# Patient Record
Sex: MALE | Employment: PART TIME | URBAN - METROPOLITAN AREA
[De-identification: names, ages, dates, MRNs, and addresses within clinical notes are randomized per-mention and may not be internally consistent; named-entity substitution may affect disease eponyms.]

---

## 2017-06-27 ENCOUNTER — OFFICE VISIT (OUTPATIENT)
Dept: SURGERY | Age: 62
End: 2017-06-27

## 2017-06-27 VITALS
RESPIRATION RATE: 18 BRPM | HEART RATE: 70 BPM | DIASTOLIC BLOOD PRESSURE: 86 MMHG | BODY MASS INDEX: 25.73 KG/M2 | OXYGEN SATURATION: 96 % | SYSTOLIC BLOOD PRESSURE: 140 MMHG | HEIGHT: 72 IN | WEIGHT: 190 LBS | TEMPERATURE: 98.2 F

## 2017-06-27 DIAGNOSIS — Z12.11 ENCOUNTER FOR SCREENING COLONOSCOPY: Primary | ICD-10-CM

## 2017-06-27 RX ORDER — AMLODIPINE BESYLATE 10 MG/1
TABLET ORAL DAILY
COMMUNITY

## 2017-06-27 RX ORDER — POLYETHYLENE GLYCOL 3350, SODIUM SULFATE ANHYDROUS, SODIUM BICARBONATE, SODIUM CHLORIDE, POTASSIUM CHLORIDE 236; 22.74; 6.74; 5.86; 2.97 G/4L; G/4L; G/4L; G/4L; G/4L
POWDER, FOR SOLUTION ORAL
Qty: 1 BOTTLE | Refills: 0 | Status: SHIPPED | OUTPATIENT
Start: 2017-06-27

## 2017-06-27 NOTE — MR AVS SNAPSHOT
Visit Information Date & Time Provider Department Dept. Phone Encounter #  
 6/27/2017  9:00 AM BUBBA Vasquez. [id number] Upcoming Health Maintenance Date Due Hepatitis C Screening 1955 DTaP/Tdap/Td series (1 - Tdap) 8/17/1975 FOBT Q 1 YEAR AGE 50-75 8/17/2004 ZOSTER VACCINE AGE 60> 8/17/2014 INFLUENZA AGE 9 TO ADULT 8/1/2017 Allergies as of 6/27/2017  Review Complete On: 6/27/2017 By: Jose Enrique Gallo LPN No Known Allergies Current Immunizations  Never Reviewed No immunizations on file. Not reviewed this visit Vitals BP Pulse Temp Resp Height(growth percentile) Weight(growth percentile) 140/86 70 98.2 °F (36.8 °C) (Oral) 18 6' (1.829 m) 190 lb (86.2 kg) SpO2 BMI Smoking Status 96% 25.77 kg/m2 Former Smoker BMI and BSA Data Body Mass Index Body Surface Area 25.77 kg/m 2 2.09 m 2 Preferred Pharmacy Pharmacy Name Phone [facility] [place] [address] [phone] Your Updated Medication List  
  
   
This list is accurate as of: 6/27/17  9:53 AM.  Always use your most recent med list. amLODIPine 10 mg tablet Commonly known as:  Delphina Peat Take  by mouth daily. aspirin 81 mg tablet Take 81 mg by mouth. HumuLIN 70/30 100 unit/mL (70-30) injection Generic drug:  insulin NPH/insulin regular  
by SubCUTAneous route once. HYDROcodone-acetaminophen 5-325 mg per tablet Commonly known as:  Chris Kilts Take 1 Tab by mouth every four (4) hours as needed for Pain. lisinopril 10 mg tablet Commonly known as:  Arlette Kumar Take  by mouth daily. lovastatin 10 mg tablet Commonly known as:  MEVACOR Take  by mouth nightly. PEG 3350-Electrolytes 236-22.74-6.74 -5.86 gram suspension Commonly known as:  GO-LYTELY Take as directed  Indications: BOWEL EVACUATION  
  
 ZETIA 10 mg tablet Generic drug:  ezetimibe Take  by mouth. Prescriptions Sent to Pharmacy Refills PEG 3350-Electrolytes (GO-LYTELY) 236-22.74-6.74 -5.86 gram suspension 0 Sig: Take as directed  Indications: BOWEL EVACUATION Class: Normal  
 Pharmacy: [facility] at [facility] [address] #: [phone] Patient Instructions If you have any questions or concerns about today's appointment, the verbal and/or written instructions you were given for follow up care, please call our office at [phone]. [facility] [address] [place] [address], [facility] [address] [place] [phone] office [phone] Introducing [facility]! [facility] introduces [employer] patient portal. Now you can access parts of your medical record, email your doctor's office, and request medication refills online. 1. In your internet browser, go to [email or web address] 2. Click on the First Time User? Click Here link in the Sign In box. You will see the New Member Sign Up page. 3. Enter your [employer] Access Code exactly as it appears below. You will not need to use this code after youve completed the sign-up process. If you do not sign up before the expiration date, you must request a new code. · [employer] Access Code: [email or web address] Expires: 9/25/2017  9:35 AM 
 
4. Enter the last four digits of your Social Security Number (xxxx) and Date of Birth (mm/dd/yyyy) as indicated and click Submit. You will be taken to the next sign-up page. 5. Create a MyStreamt ID. This will be your [employer] login ID and cannot be changed, so think of one that is secure and easy to remember. 6. Create a [employer] password. You can change your password at any time. 7. Enter your Password Reset Question and Answer.  This can be used at a later time if you forget your password. 8. Enter your e-mail address. You will receive e-mail notification when new information is available in [address] 9. Click Sign Up. You can now view and download portions of your medical record. 10. Click the Download Summary menu link to download a portable copy of your medical information. If you have questions, please visit the Frequently Asked Questions section of the [employer] website. Remember, [employer] is NOT to be used for urgent needs. For medical emergencies, dial 911. Now available from your iPhone and Android! Please provide this summary of care documentation to your next provider. Your primary care clinician is listed as Vince Hughes.. If you have any questions after today's visit, please call [phone].

## 2017-06-27 NOTE — PROGRESS NOTES
Colon Screen    Patient: Gladys Birch MRN: [id number]  SSN: [id number]    YOB: 1955  Age: 57 y.o. Sex: male        Subjective:   Gladys Birch presents for colon screening. PCP is Toribio Kowalski MD. Patient denies rectal pain but does have rare rectal bleeding with BM. Patient has a bowel movement 1 per day. Patient has no known family history of colon cancer. Last colonoscopy was 12 years. He remembers having 2 benign polyps. No Known Allergies    Past Medical History:   Diagnosis Date    Diabetes ([facility] [address]     Diabetes ([facility] [address] [facility]     Hypertension      Past Surgical History:   Procedure Laterality Date    HX COLONOSCOPY      approx 12 years ago   [address] [place] [address] HERNIA REPAIR  1988      Family History   Problem Relation Age of Onset    Cancer Brother      myeloma    Cancer Sister      breast     Social History   Substance Use Topics    Smoking status: Former Smoker     Quit date: 6/20/1995    Smokeless tobacco: Never Used    Alcohol use Yes      Comment: beer/day      Prior to Admission medications    Medication Sig Start Date End Date Taking? Authorizing Provider   amLODIPine (NORVASC) 10 mg tablet Take  by mouth daily. Yes Historical Provider   lisinopril (PRINIVIL, ZESTRIL) 10 mg tablet Take  by mouth daily. Yes Nat Laughlin MD   lovastatin (MEVACOR) 10 mg tablet Take  by mouth nightly. Yes Nat Laughlin MD   insulin (HUMULIN 70/30) 100 unit/mL (70-30) injection by SubCUTAneous route once. Yes Nat Laughlin MD   ezetimibe (ZETIA) 10 mg tablet Take  by mouth. Yes Nat Laughlin MD   aspirin 81 mg tablet Take 81 mg by mouth. Yes Nat Laughlin MD   HYDROcodone-acetaminophen (NORCO) 5-325 mg per tablet Take 1 Tab by mouth every four (4) hours as needed for Pain. 6/20/13   Paul Pak NP          Review of Systems:  Gastrointestinal: positive for diarrhea      Risks colonoscopy described- colon injury, missed lesion, anesthesia problems, bleeding.     Colonoscopy preparation reviewed in detail with patient and a copy of the instructions was provided to the patient.        Angus Epstein LPN  June 27, 5809  8:25 AM

## 2017-06-27 NOTE — PATIENT INSTRUCTIONS
If you have any questions or concerns about today's appointment, the verbal and/or written instructions you were given for follow up care, please call our office at [phone].     [facility] - [address]    [phone] office  [phone]

## 2017-09-14 ENCOUNTER — HOSPITAL ENCOUNTER (OUTPATIENT)
Age: 62
Setting detail: OUTPATIENT SURGERY
Discharge: HOME | End: 2017-09-14
Attending: COLON & RECTAL SURGERY | Admitting: COLON & RECTAL SURGERY
Payer: COMMERCIAL

## 2017-09-14 VITALS
SYSTOLIC BLOOD PRESSURE: 104 MMHG | BODY MASS INDEX: 25.65 KG/M2 | DIASTOLIC BLOOD PRESSURE: 66 MMHG | RESPIRATION RATE: 18 BRPM | HEIGHT: 72 IN | WEIGHT: 189.4 LBS | TEMPERATURE: 98.4 F | OXYGEN SATURATION: 97 % | HEART RATE: 58 BPM

## 2017-09-14 LAB — GLUCOSE BLD STRIP.AUTO-MCNC: 91 MG/DL (ref 70–110)

## 2017-09-14 PROCEDURE — 76040000019: Performed by: COLON & RECTAL SURGERY

## 2017-09-14 PROCEDURE — G0500 MOD SEDAT ENDO SERVICE >5YRS: HCPCS | Performed by: COLON & RECTAL SURGERY

## 2017-09-14 PROCEDURE — 77030031670 HC DEV INFL ENDOTEK BIG60 MRTM -B: Performed by: COLON & RECTAL SURGERY

## 2017-09-14 PROCEDURE — 82962 GLUCOSE BLOOD TEST: CPT

## 2017-09-14 PROCEDURE — 74011250636 HC RX REV CODE- 250/636: Performed by: COLON & RECTAL SURGERY

## 2017-09-14 RX ORDER — SODIUM CHLORIDE 0.9 % (FLUSH) 0.9 %
5-10 SYRINGE (ML) INJECTION EVERY 8 HOURS
Status: DISCONTINUED | OUTPATIENT
Start: 2017-09-14 | End: 2017-09-14 | Stop reason: HOSPADM

## 2017-09-14 RX ORDER — FLUMAZENIL 0.1 MG/ML
0.2 INJECTION INTRAVENOUS
Status: DISCONTINUED | OUTPATIENT
Start: 2017-09-14 | End: 2017-09-14 | Stop reason: HOSPADM

## 2017-09-14 RX ORDER — NALOXONE HYDROCHLORIDE 0.4 MG/ML
0.4 INJECTION, SOLUTION INTRAMUSCULAR; INTRAVENOUS; SUBCUTANEOUS
Status: DISCONTINUED | OUTPATIENT
Start: 2017-09-14 | End: 2017-09-14 | Stop reason: HOSPADM

## 2017-09-14 RX ORDER — SODIUM CHLORIDE 9 MG/ML
50 INJECTION, SOLUTION INTRAVENOUS CONTINUOUS
Status: DISCONTINUED | OUTPATIENT
Start: 2017-09-14 | End: 2017-09-14 | Stop reason: HOSPADM

## 2017-09-14 RX ORDER — MIDAZOLAM HYDROCHLORIDE 1 MG/ML
.25-5 INJECTION, SOLUTION INTRAMUSCULAR; INTRAVENOUS
Status: DISCONTINUED | OUTPATIENT
Start: 2017-09-14 | End: 2017-09-14 | Stop reason: HOSPADM

## 2017-09-14 RX ORDER — ATROPINE SULFATE 0.1 MG/ML
0.5 INJECTION INTRAVENOUS
Status: DISCONTINUED | OUTPATIENT
Start: 2017-09-14 | End: 2017-09-14 | Stop reason: HOSPADM

## 2017-09-14 RX ORDER — SODIUM CHLORIDE 0.9 % (FLUSH) 0.9 %
5-10 SYRINGE (ML) INJECTION AS NEEDED
Status: DISCONTINUED | OUTPATIENT
Start: 2017-09-14 | End: 2017-09-14 | Stop reason: HOSPADM

## 2017-09-14 RX ORDER — DEXTROMETHORPHAN/PSEUDOEPHED 2.5-7.5/.8
1.2 DROPS ORAL
Status: DISCONTINUED | OUTPATIENT
Start: 2017-09-14 | End: 2017-09-14 | Stop reason: HOSPADM

## 2017-09-14 RX ORDER — EPINEPHRINE 0.1 MG/ML
1 INJECTION INTRACARDIAC; INTRAVENOUS
Status: DISCONTINUED | OUTPATIENT
Start: 2017-09-14 | End: 2017-09-14 | Stop reason: HOSPADM

## 2017-09-14 RX ADMIN — SODIUM CHLORIDE 50 ML/HR: 900 INJECTION, SOLUTION INTRAVENOUS at 14:50

## 2017-09-14 NOTE — PROCEDURES
Colonoscopy Procedure Note      Naif Chi  [birth year]  [id number]                Date of Procedure: 9/14/2017    Indications:    Screening colonoscopy     Preoperative diagnosis: z12.11 colon cancer screening      Postoperative diagnosis: diverticulosis     Title of Procedure:  Colonoscopy, screening    :  Irma Arechiga MD    Assistant(s): Endoscopy RN-1: Neisha Garcia RN  Endoscopy RN-2: Naresh Pike RN    Referring Source:  Not On File Bshsi    Sedation:  Demerol 50 mg IV,  Versed 4 mg IV      ASA Class: ASA 3 - Severe systemic disease but compensated        Procedure Details:    Prior to the procedure, a history and physical were performed. The patients medications, allergies and sensitivities were reviewed and all questions were answered. After informed consent was obtained for the procedure, with all risks and benefits of procedure explained. The patient was taken to the endoscopy suite and placed in the left lateral decubitus position. Patient identification and proposed procedure were verified prior to the procedure by the nurse and I. Following the  satisfactory administration of sedation,  the anus was inspected and appeared within normal limits. Digital rectal examination revealed Normal sphincter tone and squeeze pressure. Palpation revealed No Masses. Next the Olympus video colonoscope was introduced through the anus and advanced to cecum, which was identified by the ileocecal valve and appendiceal orifice, terminal ileum. The quality of preparation was good. The terminal ileum was visualized. The colonoscope was then slowly withdrawn and the mucosa carefully examined for any abnormalities. Cecal withdrawl time was greater than six minutes. The patient tolerated the procedure well. Findings:   Rectum: normal  Sigmoid: mild diverticulosis; Descending Colon: mild diverticulosis;  Transverse Colon: mild diverticulosis;   Ascending Colon: mild diverticulosis; Cecum: normal  Terminal Ileum: normal    Interventions:  none    Specimen Removed: * No specimens in log *     Complications: None. EBL:  None. Impression:    diverticulosis      Recommendations: -Repeat colonoscopy in 10 years   Resume normal medication(s). Discharge Disposition:  Home in the company of a  when able to ambulate.         Irma Arechiga MD, FACS, FASCRS  Colon and Rectal Surgery  [facility]  Office [phone]  Fax     [phone]  9/14/2017  4:47 PM       [facility]  [address] [facility], [address]

## 2017-09-14 NOTE — DISCHARGE INSTRUCTIONS
Colonoscopy Discharge Instructions       Jaclyn Myers  [id number]  1955      COLONOSCOPY FINDINGS:  Your colonoscopy showed:  Mild diverticulosis of the colon, otherwise normal examination. FOLLOW UP RECOMMENDATIONS:   Dr. Lennox Catalan recommends your next colonoscopy in 10 years. DISCOMFORT:  If you have redness at your IV site- apply warm compress to area; if redness or soreness persist- contact your physician  There may be a slight amount of blood passed from the rectum, more than a teaspoon of bright red blood is not expected - contact your physician  Gaseous discomfort is common- walking, belching will help relieve any gas pains. If discomfort persist- contact your physician    DIET:   High fiber diet. ACTIVITY:  You may resume your normal daily activities, however, it is recommended that you spend the remainder of the day resting - avoiding any strenuous activities. You may not operate a vehicle for 24 hours  You may not engage in an occupation involving machinery or appliances for rest of today  You may not drink alcoholic beverages for at least 24 hours  Avoid making any critical decisions for at least 24 hour    CALL M.D. ANY SIGN OF:   Increasing pain, nausea, vomiting  Abdominal distension (swelling)  New increased bleeding   Fever or chills  Pain in chest area or shortness of breath      Jose Avila MD, FACS, FASCRS  Colon and Rectal Surgery  [facility]  Office [phone]  Fax     [phone]         Colonoscopy: What to Expect at Home  Your Recovery  After you have a colonoscopy, you will stay at the clinic for 1 to 2 hours until the medicines wear off. Then you can go home. But you will need to arrange for a ride. Your doctor will tell you when you can eat and do your other usual activities. Your doctor will talk to you about when you will need your next colonoscopy. Your doctor can help you decide how often you need to be checked.  This will depend on the results of your test and your risk for colorectal cancer. After the test, you may be bloated or have gas pains. You may need to pass gas. If a biopsy was done or a polyp was removed, you may have streaks of blood in your stool (feces) for a few days. This care sheet gives you a general idea about how long it will take for you to recover. But each person recovers at a different pace. Follow the steps below to get better as quickly as possible. How can you care for yourself at home? Activity  · Rest when you feel tired. · You can do your normal activities when it feels okay to do so. Diet  · Follow your doctor's directions for eating. · Unless your doctor has told you not to, drink plenty of fluids. This helps to replace the fluids that were lost during the colon prep. · Do not drink alcohol. Medicines  · Your doctor will tell you if and when you can restart your medicines. He or she will also give you instructions about taking any new medicines. · If you take blood thinners, such as warfarin (Coumadin), clopidogrel (Plavix), or aspirin, be sure to talk to your doctor. He or she will tell you if and when to start taking those medicines again. Make sure that you understand exactly what your doctor wants you to do. · If polyps were removed or a biopsy was done during the test, your doctor may tell you not to take aspirin or other anti-inflammatory medicines for a few days. These include ibuprofen (Advil, Motrin) and naproxen (Aleve). Other instructions  · For your safety, do not drive or operate machinery until the medicine wears off and you can think clearly. Your doctor may tell you not to drive or operate machinery until the day after your test.  · Do not sign legal documents or make major decisions until the medicine wears off and you can think clearly. The anesthesia can make it hard for you to fully understand what you are agreeing to. Follow-up care is a key part of your treatment and safety. Be sure to make and go to all appointments, and call your doctor if you are having problems. It's also a good idea to know your test results and keep a list of the medicines you take. When should you call for help? Call 911 anytime you think you may need emergency care. For example, call if:  · You passed out (lost consciousness). · You pass maroon or bloody stools. · You have severe belly pain. Call your doctor now or seek immediate medical care if:  · Your stools are black and tarlike. · Your stools have streaks of blood, but you did not have a biopsy or any polyps removed. · You have belly pain, or your belly is swollen and firm. · You vomit. · You have a fever. · You are very dizzy. Watch closely for changes in your health, and be sure to contact your doctor if you have any problems. Where can you learn more? Go to http://amy-rubin.info/. Enter E264 in the search box to learn more about \"Colonoscopy: What to Expect at Home. \"  Current as of: August 9, 2016  Content Version: 11.3  © 8773-9693 [employer]. Care instructions adapted under license by [employer] (which disclaims liability or warranty for this information). If you have questions about a medical condition or this instruction, always ask your healthcare professional. [address] any warranty or liability for your use of this information.       DISCHARGE SUMMARY from Nurse    The following personal items are in your possession at time of discharge:    Dental Appliances: None  Visual Aid: None                            PATIENT INSTRUCTIONS:    After general anesthesia or intravenous sedation, for 24 hours or while taking prescription Narcotics:  · Limit your activities  · Do not drive and operate hazardous machinery  · Do not make important personal or business decisions  · Do  not drink alcoholic beverages  · If you have not urinated within 8 hours after discharge, please contact your surgeon on call. Report the following to your surgeon:  · Excessive pain, swelling, redness or odor of or around the surgical area  · Temperature over 100.5  · Nausea and vomiting lasting longer than 4 hours or if unable to take medications  · Any signs of decreased circulation or nerve impairment to extremity: change in color, persistent  numbness, tingling, coldness or increase pain  · Any questions        What to do at Home:  These are general instructions for a healthy lifestyle:    No smoking/ No tobacco products/ Avoid exposure to second hand smoke    Surgeon General's Warning:  Quitting smoking now greatly reduces serious risk to your health. Obesity, smoking, and sedentary lifestyle greatly increases your risk for illness    A healthy diet, regular physical exercise & weight monitoring are important for maintaining a healthy lifestyle    You may be retaining fluid if you have a history of heart failure or if you experience any of the following symptoms:  Weight gain of 3 pounds or more overnight or 5 pounds in a week, increased swelling in our hands or feet or shortness of breath while lying flat in bed. Please call your doctor as soon as you notice any of these symptoms; do not wait until your next office visit. Recognize signs and symptoms of STROKE:    F-face looks uneven    A-arms unable to move or move unevenly    S-speech slurred or non-existent    T-time-call 911 as soon as signs and symptoms begin-DO NOT go       Back to bed or wait to see if you get better-TIME IS BRAIN. Warning Signs of HEART ATTACK     Call 911 if you have these symptoms:   Chest discomfort. Most heart attacks involve discomfort in the center of the chest that lasts more than a few minutes, or that goes away and comes back. It can feel like uncomfortable pressure, squeezing, fullness, or pain.  Discomfort in other areas of the upper body.  Symptoms can include pain or discomfort in one or both arms, the back, neck, jaw, or stomach.  Shortness of breath with or without chest discomfort.  Other signs may include breaking out in a cold sweat, nausea, or lightheadedness. Don't wait more than five minutes to call 911 - MINUTES MATTER! Fast action can save your life. Calling 911 is almost always the fastest way to get lifesaving treatment. Emergency Medical Services staff can begin treatment when they arrive -- up to an hour sooner than if someone gets to the hospital by car. The discharge information has been reviewed with the patient. The patient verbalized understanding. Discharge medications reviewed with the patient and appropriate educational materials and side effects teaching were provided. Patient armband removed and given to patient to take home.   Patient was informed of the privacy risks if armband lost or stolen

## 2017-09-14 NOTE — IP AVS SNAPSHOT
[address] [place] [address] [address] 
[phone] Patient: Stanford Guzman MRN: [id number] You are allergic to the following No active allergies Recent Documentation Height Weight BMI Smoking Status 1.829 m 85.9 kg 25.69 kg/m2 Former Smoker Emergency Contacts Name Discharge Info Relation Home Work Mobile Shabana Ackerman DISCHARGE CAREGIVER [3] Child [2] [phone] About your hospitalization You were admitted on:  September 14, 2017 You last received care in the:  [facility] PHASE 2 RECOVERY You were discharged on:  September 14, 2017 Unit phone number:  [phone] Why you were hospitalized Your primary diagnosis was:  Not on File Providers Seen During Your Hospitalizations Provider Role Specialty Primary office phone Ruben Ordoñez MD Attending Provider Colon and Rectal Surgery [phone] Your Primary Care Physician (PCP) Primary Care Physician Office Phone Office Fax NOT ON FILE ** None ** ** None ** Follow-up Information Follow up With Details Comments Contact Info Not On File Bshsi   Not On File (62) Patient has a PCP but that physician is not listed in [address] [facility]. Ruben Ordoñez MD  Please contact Dr Shakira Pope for any questions [id number] [address] [phone] Current Discharge Medication List  
  
CONTINUE these medications which have NOT CHANGED Dose & Instructions Dispensing Information Comments Morning Noon Evening Bedtime  
 amLODIPine 10 mg tablet Commonly known as:  Lei Atwoodon Your last dose was: Your next dose is: Take  by mouth daily. Refills:  0  
     
   
   
   
  
 aspirin 81 mg tablet Your last dose was: Your next dose is:    
   
   
 Dose:  81 mg Take 81 mg by mouth. Refills:  0 HumuLIN 70/30 100 unit/mL (70-30) injection Generic drug:  insulin NPH/insulin regular Your last dose was: Your next dose is:    
   
   
 by SubCUTAneous route once. Refills:  0 HYDROcodone-acetaminophen 5-325 mg per tablet Commonly known as:  Izzy Figueroaa Your last dose was: Your next dose is:    
   
   
 Dose:  1 Tab Take 1 Tab by mouth every four (4) hours as needed for Pain. Quantity:  20 Tab Refills:  0  
     
   
   
   
  
 lisinopril 10 mg tablet Commonly known as:  Jamila Bandar Your last dose was: Your next dose is: Take  by mouth daily. Refills:  0  
     
   
   
   
  
 lovastatin 10 mg tablet Commonly known as:  MEVACOR Your last dose was: Your next dose is: Take  by mouth nightly. Refills:  0  
     
   
   
   
  
 PEG 3350-Electrolytes 236-22.74-6.74 -5.86 gram suspension Commonly known as:  GO-LYTELY Your last dose was: Your next dose is: Take as directed  Indications: BOWEL EVACUATION Quantity:  1 Bottle Refills:  0 ZETIA 10 mg tablet Generic drug:  ezetimibe Your last dose was: Your next dose is: Take  by mouth. Refills:  0 Discharge Instructions Colonoscopy Discharge Instructions Mitch Callaway [id number] COLONOSCOPY FINDINGS: 
Your colonoscopy showed:  Mild diverticulosis of the colon, otherwise normal examination. FOLLOW UP RECOMMENDATIONS: 
 Dr. Amor Bailey recommends your next colonoscopy in 10 years. DISCOMFORT: 
If you have redness at your IV site- apply warm compress to area; if redness or soreness persist- contact your physician There may be a slight amount of blood passed from the rectum, more than a teaspoon of bright red blood is not expected - contact your physician Gaseous discomfort is common- walking, belching will help relieve any gas pains. If discomfort persist- contact your physician DIET: 
 High fiber diet. ACTIVITY: 
You may resume your normal daily activities, however, it is recommended that you spend the remainder of the day resting - avoiding any strenuous activities. You may not operate a vehicle for 24 hours You may not engage in an occupation involving machinery or appliances for rest of today You may not drink alcoholic beverages for at least 24 hours Avoid making any critical decisions for at least 24 hour CALL M.D. ANY SIGN OF: Increasing pain, nausea, vomiting Abdominal distension (swelling) New increased bleeding Fever or chills Pain in chest area or shortness of breath Francia Mehta MD, FACS, FASCRS Colon and Rectal Surgery [facility] Office [phone] Fax     [phone] Colonoscopy: What to Expect at [facility] Your Recovery After you have a colonoscopy, you will stay at the clinic for 1 to 2 hours until the medicines wear off. Then you can go home. But you will need to arrange for a ride. Your doctor will tell you when you can eat and do your other usual activities. Your doctor will talk to you about when you will need your next colonoscopy. Your doctor can help you decide how often you need to be checked. This will depend on the results of your test and your risk for colorectal cancer. After the test, you may be bloated or have gas pains. You may need to pass gas. If a biopsy was done or a polyp was removed, you may have streaks of blood in your stool (feces) for a few days. This care sheet gives you a general idea about how long it will take for you to recover. But each person recovers at a different pace. Follow the steps below to get better as quickly as possible. How can you care for yourself at home? Activity · Rest when you feel tired. · You can do your normal activities when it feels okay to do so. Diet · Follow your doctor's directions for eating. · Unless your doctor has told you not to, drink plenty of fluids. This helps to replace the fluids that were lost during the colon prep. · Do not drink alcohol. Medicines · Your doctor will tell you if and when you can restart your medicines. He or she will also give you instructions about taking any new medicines. · If you take blood thinners, such as warfarin (Coumadin), clopidogrel (Plavix), or aspirin, be sure to talk to your doctor. He or she will tell you if and when to start taking those medicines again. Make sure that you understand exactly what your doctor wants you to do. · If polyps were removed or a biopsy was done during the test, your doctor may tell you not to take aspirin or other anti-inflammatory medicines for a few days. These include ibuprofen (Advil, Motrin) and naproxen (Aleve). Other instructions · For your safety, do not drive or operate machinery until the medicine wears off and you can think clearly. Your doctor may tell you not to drive or operate machinery until the day after your test. 
· Do not sign legal documents or make major decisions until the medicine wears off and you can think clearly. The anesthesia can make it hard for you to fully understand what you are agreeing to. Follow-up care is a key part of your treatment and safety. Be sure to make and go to all appointments, and call your doctor if you are having problems. It's also a good idea to know your test results and keep a list of the medicines you take. When should you call for help? Call 911 anytime you think you may need emergency care. For example, call if: 
· You passed out (lost consciousness). · You pass maroon or bloody stools. · You have severe belly pain. Call your doctor now or seek immediate medical care if: 
· Your stools are black and tarlike. · Your stools have streaks of blood, but you did not have a biopsy or any polyps removed. · You have belly pain, or your belly is swollen and firm. · You vomit. · You have a fever. · You are very dizzy. Watch closely for changes in your health, and be sure to contact your doctor if you have any problems. Where can you learn more? Go to http://amy-rubin.info/. Enter E264 in the search box to learn more about \"Colonoscopy: What to Expect at Home. \" Current as of: August 9, 2016 Content Version: 11.3 © 8559-3107 [employer]. Care instructions adapted under license by [employer] (which disclaims liability or warranty for this information). If you have questions about a medical condition or this instruction, always ask your healthcare professional. [address] any warranty or liability for your use of this information. DISCHARGE SUMMARY from Nurse The following personal items are in your possession at time of discharge: 
 
Dental Appliances: None Visual Aid: None PATIENT INSTRUCTIONS: 
 
After general anesthesia or intravenous sedation, for 24 hours or while taking prescription Narcotics: · Limit your activities · Do not drive and operate hazardous machinery · Do not make important personal or business decisions · Do  not drink alcoholic beverages · If you have not urinated within 8 hours after discharge, please contact your surgeon on call. Report the following to your surgeon: 
· Excessive pain, swelling, redness or odor of or around the surgical area · Temperature over 100.5 · Nausea and vomiting lasting longer than 4 hours or if unable to take medications · Any signs of decreased circulation or nerve impairment to extremity: change in color, persistent  numbness, tingling, coldness or increase pain · Any questions What to do at Home: These are general instructions for a healthy lifestyle: No smoking/ No tobacco products/ Avoid exposure to second hand smoke Surgeon General's Warning:  Quitting smoking now greatly reduces serious risk to your health. Obesity, smoking, and sedentary lifestyle greatly increases your risk for illness A healthy diet, regular physical exercise & weight monitoring are important for maintaining a healthy lifestyle You may be retaining fluid if you have a history of heart failure or if you experience any of the following symptoms:  Weight gain of 3 pounds or more overnight or 5 pounds in a week, increased swelling in our hands or feet or shortness of breath while lying flat in bed. Please call your doctor as soon as you notice any of these symptoms; do not wait until your next office visit. Recognize signs and symptoms of STROKE: 
 
F-face looks uneven A-arms unable to move or move unevenly S-speech slurred or non-existent T-time-call 911 as soon as signs and symptoms begin-DO NOT go Back to bed or wait to see if you get better-TIME IS BRAIN. Warning Signs of HEART ATTACK Call 911 if you have these symptoms: 
? Chest discomfort. Most heart attacks involve discomfort in the center of the chest that lasts more than a few minutes, or that goes away and comes back. It can feel like uncomfortable pressure, squeezing, fullness, or pain. ? Discomfort in other areas of the upper body. Symptoms can include pain or discomfort in one or both arms, the back, neck, jaw, or stomach. ? Shortness of breath with or without chest discomfort. ? Other signs may include breaking out in a cold sweat, nausea, or lightheadedness. Don't wait more than five minutes to call [address]! Fast action can save your life. Calling 911 is almost always the fastest way to get lifesaving treatment.  Emergency Medical Services staff can begin treatment when they arrive  up to an hour sooner than if someone gets to the hospital by car. The discharge information has been reviewed with the patient. The patient verbalized understanding. Discharge medications reviewed with the patient and appropriate educational materials and side effects teaching were provided. Patient armband removed and given to patient to take home. Patient was informed of the privacy risks if armband lost or stolen Discharge Orders None Introducing [facility] SERVICES! [employer] introduces [employer] [employer] [facility] patient portal. Now you can access parts of your medical record, email your doctor's office, and request medication refills online. 1. In your internet browser, go to [email or web address] 2. Click on the First Time User? Click Here link in the Sign In box. You will see the New Member Sign Up page. 3. Enter your [facility] Access Code exactly as it appears below. You will not need to use this code after youve completed the sign-up process. If you do not sign up before the expiration date, you must request a new code. · [facility] [facility] Access Code: [email or web address] Expires: 9/25/2017  9:35 AM 
 
4. Enter the last four digits of your Social Security Number (xxxx) and Date of Birth (mm/dd/yyyy) as indicated and click Submit. You will be taken to the next sign-up page. 5. Create a [facility] ID. This will be your [employer] [employer] [facility] login ID and cannot be changed, so think of one that is secure and easy to remember. 6. Create a [facility] password. You can change your password at any time. 7. Enter your Password Reset Question and Answer. This can be used at a later time if you forget your password. 8. Enter your e-mail address. You will receive e-mail notification when new information is available in [address] 9. Click Sign Up. You can now view and download portions of your medical record. 10. Click the Download Summary menu link to download a portable copy of your medical information. If you have questions, please visit the Frequently Asked Questions section of the [employer] website. Remember, MyChart is NOT to be used for urgent needs. For medical emergencies, dial 911. Now available from your iPhone and Android! General Information Please provide this summary of care documentation to your next provider. Patient Signature:  ____________________________________________________________ Date:  ____________________________________________________________  
  
Arna Nik Provider Signature:  ____________________________________________________________ Date:  ____________________________________________________________

## 2017-09-14 NOTE — H&P
[facility]  [address] [address], [facility]        Colon and Rectal Surgery History and Physical    Subjective:      Mitch Callaway is a 60 y.o. male who presents for colonoscopy screening. PCP is Marcelina Stevens MD. Patient denies rectal pain but does have rare rectal bleeding with BM. Patient has a bowel movement 1 per day. Patient has no known family history of colon cancer.      Last colonoscopy was 12 years. He remembers having 2 benign polyps. Patient Active Problem List    Diagnosis Date Noted    Ankle sprain and strain 06/20/2013     Past Medical History:   Diagnosis Date    Diabetes ([address]     Diabetes ([facility] [address]     Hypertension       Past Surgical History:   Procedure Laterality Date    HX COLONOSCOPY      approx 12 years ago     [facility]      Social History   Substance Use Topics    Smoking status: Former Smoker     Quit date: 6/20/1995    Smokeless tobacco: Never Used    Alcohol use Yes      Comment: 1 beer/day      Family History   Problem Relation Age of Onset    Cancer Brother      myeloma    Cancer Sister      breast      Prior to Admission medications    Medication Sig Start Date End Date Taking? Authorizing Provider   amLODIPine (NORVASC) 10 mg tablet Take  by mouth daily. Yes Historical Provider   lisinopril (PRINIVIL, ZESTRIL) 10 mg tablet Take  by mouth daily. Yes Nat Laughlin MD   lovastatin (MEVACOR) 10 mg tablet Take  by mouth nightly. Yes Nat Laughlin MD   insulin (HUMULIN 70/30) 100 unit/mL (70-30) injection by SubCUTAneous route once. Yes Nat Laughlin MD   PEG 3350-Electrolytes (GO-LYTELY) 681-96.69-1.63 -5.86 gram suspension Take as directed  Indications: BOWEL EVACUATION 6/27/17   Lola Hernandez MD   ezetimibe (ZETIA) 10 mg tablet Take  by mouth. Nat Laughlin MD   aspirin 81 mg tablet Take 81 mg by mouth.     Nat Laughlin MD   HYDROcodone-acetaminophen (NORCO) 5-325 mg per tablet Take 1 Tab by mouth every four (4) hours as needed for Pain. 6/20/13   Alvenia Spatz, NP     No current facility-administered medications for this encounter. No Known Allergies         Objective:     Visit Vitals    Ht 6' (1.829 m)    Wt 85.9 kg (189 lb 6.4 oz)    BMI 25.69 kg/m2        Physical Exam:   GENERAL: alert, cooperative, no distress, appears stated age  LUNG: clear to auscultation bilaterally  HEART: regular rate and rhythm, S1, S2 normal, no murmur, click, rub or gallop  ABDOMEN: soft, non-tender. Bowel sounds normal. No masses,  no organomegaly       Assessment:     Ayad Barclay is a 60 y.o. male who requires colonoscopy for   Screening colonoscopy. Plan:     1. I recommend proceeding with colonoscopy. The patient was in full agreement and was eager to proceed. 2. I discussed the details of the colonoscopy procedure. The risks of colonoscopy were discussed including colon injury/perforation, anesthesia issues, bleeding, and the possibility of incomplete examination. The patient was willing to accept these risks and proceed with the examination. All questions were answered to the patient's satisfaction.          Chintan Bravo MD, FACS, FASCRS  Colon and Rectal Surgery  [employer] [facility]  Office [phone]  Fax     [phone]  9/14/2017  2:44 PM

## 2017-09-14 NOTE — PERIOP NOTES
pts daughter Kevin Or will be picking him up. Called and verified that she will be in the waiting room. [phone]. Daughter able to recieve medication information.

## 2024-10-07 ENCOUNTER — TELEPHONE (OUTPATIENT)
Dept: NEUROLOGY | Facility: CLINIC | Age: 69
End: 2024-10-07

## 2024-10-07 NOTE — TELEPHONE ENCOUNTER
Caller:  GALINA GLASS    Relationship:  DAUGHTER    Best call back number:[phone]    PATIENT CALLED REQUESTING TO CANCEL SAME DAY APPT.    Did the patient call AFTER the start time of their scheduled appointment?    NO    Was the patient's appointment rescheduled?  YES      Any additional information: HOUSEHOLD SICK  
full range of motion in all extremities

## 2025-01-02 RX ORDER — DONEPEZIL HYDROCHLORIDE 10 MG/1
10 TABLET, FILM COATED ORAL NIGHTLY
COMMUNITY

## 2025-01-02 RX ORDER — CLOTRIMAZOLE 1 %
1 CREAM (GRAM) TOPICAL 2 TIMES DAILY
COMMUNITY

## 2025-01-02 RX ORDER — SIMVASTATIN 20 MG
20 TABLET ORAL NIGHTLY
COMMUNITY

## 2025-01-02 RX ORDER — TAMSULOSIN HYDROCHLORIDE 0.4 MG/1
1 CAPSULE ORAL DAILY
COMMUNITY

## 2025-01-02 RX ORDER — LANOLIN ALCOHOL/MO/W.PET/CERES
1000 CREAM (GRAM) TOPICAL DAILY
COMMUNITY

## 2025-01-02 RX ORDER — CHOLECALCIFEROL (VITAMIN D3) 25 MCG
1000 TABLET ORAL DAILY
COMMUNITY

## 2025-01-02 RX ORDER — LISINOPRIL 20 MG/1
20 TABLET ORAL DAILY
COMMUNITY

## 2025-04-01 ENCOUNTER — OFFICE VISIT (OUTPATIENT)
Dept: NEUROLOGY | Facility: CLINIC | Age: 70
End: 2025-04-01
Payer: MEDICARE

## 2025-04-01 VITALS
SYSTOLIC BLOOD PRESSURE: 102 MMHG | HEIGHT: 71 IN | DIASTOLIC BLOOD PRESSURE: 68 MMHG | WEIGHT: 169.2 LBS | HEART RATE: 63 BPM | BODY MASS INDEX: 23.69 KG/M2 | OXYGEN SATURATION: 97 %

## 2025-04-01 DIAGNOSIS — R15.2 INCONTINENCE OF FECES WITH FECAL URGENCY: ICD-10-CM

## 2025-04-01 DIAGNOSIS — R47.1 DYSARTHRIA: ICD-10-CM

## 2025-04-01 DIAGNOSIS — N39.498 OTHER URINARY INCONTINENCE: ICD-10-CM

## 2025-04-01 DIAGNOSIS — R41.3 MEMORY LOSS: Primary | ICD-10-CM

## 2025-04-01 DIAGNOSIS — R15.9 INCONTINENCE OF FECES WITH FECAL URGENCY: ICD-10-CM

## 2025-04-01 DIAGNOSIS — R45.86 EMOTIONAL LABILITY: ICD-10-CM

## 2025-04-01 PROBLEM — D29.4: Status: ACTIVE | Noted: 2020-10-29

## 2025-04-01 PROBLEM — N47.1 PHIMOSIS: Status: ACTIVE | Noted: 2018-10-17

## 2025-04-01 PROCEDURE — 1160F RVW MEDS BY RX/DR IN RCRD: CPT | Performed by: NURSE PRACTITIONER

## 2025-04-01 PROCEDURE — 1159F MED LIST DOCD IN RCRD: CPT | Performed by: NURSE PRACTITIONER

## 2025-04-01 PROCEDURE — 99204 OFFICE O/P NEW MOD 45 MIN: CPT | Performed by: NURSE PRACTITIONER

## 2025-04-01 RX ORDER — MUPIROCIN 20 MG/G
OINTMENT TOPICAL
COMMUNITY
Start: 2024-12-21

## 2025-04-01 RX ORDER — FLUOCINONIDE 0.5 MG/G
CREAM TOPICAL
COMMUNITY

## 2025-04-01 RX ORDER — MIRABEGRON 50 MG/1
50 TABLET, FILM COATED, EXTENDED RELEASE ORAL DAILY
COMMUNITY
Start: 2025-03-27

## 2025-04-01 RX ORDER — MULTIPLE VITAMINS W/ MINERALS TAB 9MG-400MCG
1 TAB ORAL DAILY
COMMUNITY

## 2025-04-01 RX ORDER — MEMANTINE HYDROCHLORIDE 10 MG/1
10 TABLET ORAL DAILY
Qty: 30 TABLET | Refills: 2 | Status: SHIPPED | OUTPATIENT
Start: 2025-04-01 | End: 2026-04-01

## 2025-04-01 RX ORDER — CLOTRIMAZOLE AND BETAMETHASONE DIPROPIONATE 10; .64 MG/G; MG/G
CREAM TOPICAL
COMMUNITY

## 2025-04-01 RX ORDER — CLOBETASOL PROPIONATE 0.5 MG/G
OINTMENT TOPICAL
COMMUNITY

## 2025-04-01 NOTE — PROGRESS NOTES
Neuro Office Visit      Encounter Date: 2025   Patient Name: Mustapha Solano  : 1955   MRN: [id number]   PCP:  Jael Ann APRN     Chief Complaint:    Chief Complaint   Patient presents with    Memory Loss       History of Present Illness: Mustapha Solano is a 69 y.o. male who is here today in Neurology for memory loss.  History of Present Illness  The patient is a 69-year-old male who presents for evaluation of memory issues, emotional lability, and incontinence. He is accompanied by his medical power of , Olu Mireles.    The chief complaint includes memory issues, emotional lability, and incontinence.     Approximately 6 to 8 months ago, memory issues began to manifest, initially as confusion over dates and appointments.     The condition has since deteriorated, with recent episodes of incontinence and two falls within the past month.     The patient has been more reclusive and emotional, often crying without apparent reason.     Increased agitation has been noted, particularly with the medical power of 's 18-year-old autistic daughter, from whom he does not like to take directions.     Slurred speech has been present for the past 2 weeks, and Olu has been attempting speech exercises.    The patient has been experiencing urinary and bowel incontinence for the past 6 months.     Despite encouragement to use the bathroom regularly, this effort has been unsuccessful.     Assistance with personal hygiene is required, and he uses Depends and Chux pads for incontinence management.     Approval from urology for Myrbetriq, which costs $800 per month out of pocket, is currently being sought.    A series of stressful events over the past year, including a dispute with his cousin over the rental of his condo containing all his personal belongings, has heightened his emotional sensitivity.     He was previously prescribed Januvia for 2 years, despite not meeting the A1c criteria,  which is believed to have contributed to his current symptoms.     An episode in 12/2020 where he fell and lost consciousness prompted an investigation into a possible stroke.     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    The patient has never smoked or drank alcohol and has gained weight recently.    SOCIAL HISTORY  Occupations: Retired   Alcohol: Does not drink alcohol  Tobacco: Does not smoke      FAMILY HISTORY  - Father: Heart attack and lung cancer  - Negative for Alzheimer's, Stroke    MEDICATIONS  CURRENT MEDS:  Myrbetriq  PREVIOUS MEDS:  Januvia  Reason for Discontinuation: A1c was not high enough to qualify to be on it      Subjective      Past Medical History:   Past Medical History:   Diagnosis Date    Colon polyps     Diabetes mellitus     GERD (gastroesophageal reflux disease)     Hypertension     Memory loss        Past Surgical History:   Past Surgical History:   Procedure Laterality Date    LYMPH NODE BIOPSY  2024       Family History:   Family History   Problem Relation Age of Onset    Cancer Mother     Coronary artery disease Father     Heart attack Father        Social History:   Social History     Socioeconomic History    Marital status: Single   Tobacco Use    Smoking status: Never   Vaping Use    Vaping status: Never Used   Substance and Sexual Activity    Alcohol use: Not Currently    Drug use: Never       Medications:     Current Outpatient Medications:     Cholecalciferol 25 MCG (1000 UT) tablet, Take 1 tablet by mouth Daily., Disp: , Rfl:     clobetasol (TEMOVATE) 0.05 % ointment, , Disp: , Rfl:     clotrimazole (LOTRIMIN) 1 % cream, Apply 1 Application topically to the appropriate area as directed 2 (Two) Times a Day., Disp: , Rfl:     clotrimazole-betamethasone (LOTRISONE) 1-0.05 % cream, , Disp: , Rfl:     donepezil (ARICEPT) 10 MG tablet, Take 1 tablet by mouth Every Night., Disp: , Rfl:     fluocinonide (LIDEX) 0.05 % cream, ,  Disp: , Rfl:     lisinopril (PRINIVIL,ZESTRIL) 20 MG tablet, Take 1 tablet by mouth Daily., Disp: , Rfl:     Mirabegron ER (MYRBETRIQ) 50 MG tablet sustained-release 24 hour 24 hr tablet, Take 50 mg by mouth Daily., Disp: , Rfl:     multivitamin with minerals tablet tablet, Take 1 tablet by mouth Daily., Disp: , Rfl:     mupirocin (BACTROBAN) 2 % ointment, SMARTSI Application Topical 2-3 Times Daily, Disp: , Rfl:     omeprazole (priLOSEC) 20 MG capsule, Take 1 capsule by mouth Daily., Disp: , Rfl:     tamsulosin (FLOMAX) 0.4 MG capsule 24 hr capsule, Take 1 capsule by mouth Daily., Disp: , Rfl:     memantine (NAMENDA) 10 MG tablet, Take 1 tablet by mouth Daily., Disp: 30 tablet, Rfl: 2    simvastatin (ZOCOR) 20 MG tablet, Take 1 tablet by mouth Every Night. (Patient not taking: Reported on 2025), Disp: , Rfl:     Allergies:   No Known Allergies    PHQ-9 Total Score:     STEADI Fall Risk Assessment was completed, and patient is at HIGH risk for falls. Assessment completed on:2025    Objective     Physical Exam:     Neurological Exam  Mental Status  Awake, alert and oriented to person, place and time. Recent and remote memory are intact. Moderate dysarthria present. Able to name objects and repeat. Follows three-step commands. Attention and concentration are normal. Fund of knowledge is appropriate for level of education. MMSE score comment: Unable to perform MMSE.    .  Significant emotional lability.  Patient cries randomly..    Cranial Nerves  CN II: Visual acuity is normal.  CN III, IV, VI: Extraocular movements intact bilaterally. Pupils equal round and reactive to light bilaterally.  CN V: Facial sensation is normal.  CN VII: Full and symmetric facial movement.  CN IX, X: Palate elevates symmetrically  CN XI: Shoulder shrug strength is normal.  CN XII: Tongue midline without atrophy or fasciculations.    Motor  Normal muscle bulk throughout. No fasciculations present. Normal muscle tone. Strength is  "5/5 throughout all four extremities.    Sensory  Sensation is intact to light touch, pinprick, vibration and proprioception in all four extremities.    Reflexes                                            Right                      Left  Brachioradialis                    2+                         2+  Biceps                                 2+                         2+  Triceps                                2+                         2+  Finger flex                           2+                         2+  Hamstring                            2+                         2+  Patellar                                2+                         2+  Achilles                                2+                         2+    Coordination    Finger-to-nose, rapid alternating movements and heel-to-shin normal bilaterally without dysmetria.    Gait  Normal casual, toe, heel and tandem gait.        Vital Signs:   Vitals:    04/01/25 1046   BP: 102/68   Pulse: 63   SpO2: 97%   Weight: 76.7 kg (169 lb 3.2 oz)   Height: 180.3 cm (71\")     Body mass index is 23.6 kg/m².     Results:   Results  Labs   - A1c: 4   - Thyroid: Normal   - HCV: Negative   - Calcium: Normal   - Cholesterol panel: Normal    Imaging   - MRI of the brain: 12/2020, Diffuse atrophy of the brain     Imaging:   No Images in the past 120 days found..     Labs:   No results found for: \"CMP\", \"PROTEIN\", \"ANTIMOGAB\", \"PCFVBU3CWBO\", \"JCVRESULT\", \"QUANTTBGOLD\", \"CBCDIF\", \"IGGALBSER\"     Assessment / Plan      Assessment/Plan:   Diagnoses and all orders for this visit:    1. Memory loss (Primary)  -     MRI Brain With & Without Contrast; Future    2. Emotional lability  Comments:  Nudexta sample  Orders:  -     MRI Brain With & Without Contrast; Future    3. Dysarthria  -     MRI Brain With & Without Contrast; Future    4. Incontinence of feces with fecal urgency  -     MRI Brain With & Without Contrast; Future    5. Other urinary incontinence  -     MRI Brain With & " Without Contrast; Future    Other orders  -     memantine (NAMENDA) 10 MG tablet; Take 1 tablet by mouth Daily.  Dispense: 30 tablet; Refill: 2         Assessment & Plan  1. Memory issues.  The patient's memory issues may be attributed to normal pressure hydrocephalus, which can cause confusion, falls, and urinary incontinence. Also need to rule out stroke or brain lesion. An MRI of the brain will be ordered to rule out a previous stroke and confirm the diagnosis of normal pressure hydrocephalus. The results will be communicated upon receipt. Namenda will be initiated at a dosage of one tablet daily to manage memory issues.    2. Emotional lability.  Nuedexta will be prescribed, to be taken once daily, to manage emotional lability. The effectiveness of this treatment will be evaluated after one week.    3. Urinary incontinence.  The urinary incontinence may be related to normal pressure hydrocephalus. If the MRI confirms this diagnosis, a lumbar puncture may be considered to alleviate symptoms.    Patient Education:     Reviewed medications, potential side effects and signs and symptoms to report. Discussed risk versus benefits of treatment plan with patient and/or family-including medications, labs and radiology that may be ordered. Addressed questions and concerns during visit. Patient and/or family verbalized understanding and agree with plan. Instructed to call the office with any questions and report to ER with any life-threatening symptoms.     Follow Up:   Return in about 3 months (around 7/1/2025).    I spent 45 minutes caring for Mustapha on this date of service. This time includes time spent by me in the following activities: preparing for the visit, performing a medically appropriate examination and/or evaluation, counseling and educating the patient/family/caregiver, documenting information in the medical record, and ordering test(s).        During this visit the following were done:  Labs Reviewed []     Labs Ordered []    Radiology Reports Reviewed []    Radiology Ordered [x]    PCP Records Reviewed []    Referring Provider Records Reviewed [x]    ER Records Reviewed []    Hospital Records Reviewed []    History Obtained From Family []    Radiology Images Reviewed []    Other Reviewed []    Records Requested []      Patient or patient representative verbalized consent for the use of Ambient Listening during the visit with  JOSLYN Bella for chart documentation. 4/1/2025  16:32 EDT    JOSLYN Bella   [facility] NEUROLOGY  [address] [place] [id number] [phone]

## 2025-04-07 ENCOUNTER — TELEPHONE (OUTPATIENT)
Dept: NEUROLOGY | Facility: CLINIC | Age: 70
End: 2025-04-07

## 2025-04-07 NOTE — TELEPHONE ENCOUNTER
Caller: GALINA GLASS    Relationship: Emergency Contact    Best call back number: [phone]    Requested Prescriptions:   NUEDEX       Pharmacy where request should be sent: [facility] [address] [place] [address] [phone] [facility] [phone] FX     Last office visit with prescribing clinician: 4/1/2025   Last telemedicine visit with prescribing clinician: Visit date not found   Next office visit with prescribing clinician: 6/30/2025     Additional details provided by patient: STATES SCRIPT IS WORKING WELL FOR PATIENT.    Does the patient have less than a 3 day supply:  [] Yes  [] No    Would you like a call back once the refill request has been completed: [] Yes [x] No    If the office needs to give you a call back, can they leave a voicemail: [] Yes [x] No    Corbin Pederson Rep   04/07/25 11:43 EDT

## 2025-04-08 ENCOUNTER — TELEPHONE (OUTPATIENT)
Dept: NEUROLOGY | Facility: CLINIC | Age: 70
End: 2025-04-08
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
## 2025-04-14 ENCOUNTER — TELEPHONE (OUTPATIENT)
Dept: NEUROLOGY | Facility: CLINIC | Age: 70
End: 2025-04-14

## 2025-04-14 NOTE — TELEPHONE ENCOUNTER
Caller: STEPHANE     Relationship: MARY Guzman call back number: [phone]      What was the call regarding: NEEDS MORE INFO P.A /APPEAL FOR DX AND RX NUEDXTA    Is it okay if the provider responds through [employer]: CALL     SAID DUE TOMORROW?

## 2025-04-15 NOTE — TELEPHONE ENCOUNTER
Caller: GALINA GLASS    Relationship: Emergency Contact; DAUGHTER-IN-LAW    Best call back number: [phone]    What was the call regarding: PT'S DAUGHTER-IN-LAW STATES [facility] STILL HASN'T RECEIVED THE SCRIPT FOR PT'S NUEDEXTA RX AND SHE ALSO ADVISES THAT PT'S INSURANCE WILL REQUIRE A PRIOR AUTHORIZATION FOR THE RX AS WELL.    PT'S DAUGHTER-IN-LAW STATES PT IS NOW COMPLETELY OUT OF THE SAMPLE PACK PROVIDED BY THE OFFICE.    Do you require a callback: YES, PLEASE.    PLEASE REVIEW AND ADVISE.

## 2025-05-07 ENCOUNTER — OFFICE VISIT (OUTPATIENT)
Dept: GASTROENTEROLOGY | Facility: CLINIC | Age: 70
End: 2025-05-07
Payer: MEDICARE

## 2025-05-07 VITALS
OXYGEN SATURATION: 99 % | DIASTOLIC BLOOD PRESSURE: 86 MMHG | HEART RATE: 65 BPM | WEIGHT: 165 LBS | BODY MASS INDEX: 23.01 KG/M2 | SYSTOLIC BLOOD PRESSURE: 122 MMHG

## 2025-05-07 DIAGNOSIS — R19.5 DARK STOOLS: ICD-10-CM

## 2025-05-07 DIAGNOSIS — R15.9 INCONTINENCE OF FECES, UNSPECIFIED FECAL INCONTINENCE TYPE: ICD-10-CM

## 2025-05-07 DIAGNOSIS — R19.5 ABNORMAL STOOLS: ICD-10-CM

## 2025-05-07 DIAGNOSIS — K21.9 GASTROESOPHAGEAL REFLUX DISEASE, UNSPECIFIED WHETHER ESOPHAGITIS PRESENT: Primary | ICD-10-CM

## 2025-05-07 RX ORDER — BISACODYL 5 MG
TABLET, DELAYED RELEASE (ENTERIC COATED) ORAL
Qty: 4 TABLET | Refills: 0 | Status: SHIPPED | OUTPATIENT
Start: 2025-05-07

## 2025-05-07 RX ORDER — SODIUM CHLORIDE 9 MG/ML
30 INJECTION, SOLUTION INTRAVENOUS CONTINUOUS PRN
OUTPATIENT
Start: 2025-05-07 | End: 2025-05-08

## 2025-05-07 RX ORDER — OXYBUTYNIN CHLORIDE 10 MG/1
10 TABLET, EXTENDED RELEASE ORAL DAILY
COMMUNITY
Start: 2025-04-02 | End: 2026-04-02

## 2025-05-07 NOTE — PROGRESS NOTES
New Patient Consult      Date: 2025   Patient Name: Mustapha Solano  MRN: [id number]  : 1955     Primary Care Provider: Jael Ann APRN  Referring Provider: Seth    Chief Complaint   Patient presents with    Heartburn    Black or Bloody Stool    Fecal Incontinence     History of Present Illness: Mustapha Solano is a 69 y.o. male who is here today as a consultation with Gastroenterology for evaluation of Heartburn, Black or Bloody Stool, and Fecal Incontinence.    Patient reports feeling well. No current abdominal pain. No significant reflux or vomiting. Friend Olu gives most of the history. He fell last year and since then, his health has declined. Has been having emotional issues plus memory loss. Has upcoming MRI appt soon, following with neurology.     Has had fecal and urinary incontinence for approx 6 months. Wearing adult briefs. BMs are occurring daily, has 1-3 stools per day. Sometimes oily stools. Dark and malodorous. Has had a colonoscopy in the past, probably more than 5 years. He does recall a history of colon polyps.     Reflux symptoms sometimes. No vomiting. Takes prilosec 20 mg once daily. No reported dysphagia. No prior EGD on file.     Subjective      Past Medical History:   Diagnosis Date    Colon polyps     Diabetes mellitus     GERD (gastroesophageal reflux disease)     Hypertension     Memory loss     Scrotal abscess      Past Surgical History:   Procedure Laterality Date    LYMPH NODE BIOPSY       Family History   Problem Relation Age of Onset    Cancer Mother     Coronary artery disease Father     Heart attack Father      Social History     Socioeconomic History    Marital status: Single   Tobacco Use    Smoking status: Never   Vaping Use    Vaping status: Never Used   Substance and Sexual Activity    Alcohol use: Not Currently    Drug use: Never     Current Outpatient Medications:     Cholecalciferol 25 MCG (1000 UT) tablet, Take 1 tablet by mouth Daily., Disp:  , Rfl:     clobetasol (TEMOVATE) 0.05 % ointment, , Disp: , Rfl:     clotrimazole (LOTRIMIN) 1 % cream, Apply 1 Application topically to the appropriate area as directed 2 (Two) Times a Day., Disp: , Rfl:     clotrimazole-betamethasone (LOTRISONE) 1-0.05 % cream, , Disp: , Rfl:     donepezil (ARICEPT) 10 MG tablet, Take 1 tablet by mouth Every Night., Disp: , Rfl:     fluocinonide (LIDEX) 0.05 % cream, , Disp: , Rfl:     lisinopril (PRINIVIL,ZESTRIL) 20 MG tablet, Take 1 tablet by mouth Daily., Disp: , Rfl:     memantine (NAMENDA) 10 MG tablet, Take 1 tablet by mouth Daily., Disp: 30 tablet, Rfl: 2    Mirabegron ER (MYRBETRIQ) 50 MG tablet sustained-release 24 hour 24 hr tablet, Take 50 mg by mouth Daily., Disp: , Rfl:     multivitamin with minerals tablet tablet, Take 1 tablet by mouth Daily., Disp: , Rfl:     mupirocin (BACTROBAN) 2 % ointment, SMARTSI Application Topical 2-3 Times Daily, Disp: , Rfl:     omeprazole (priLOSEC) 20 MG capsule, Take 1 capsule by mouth Daily., Disp: , Rfl:     oxybutynin XL (DITROPAN-XL) 10 MG 24 hr tablet, Take 1 tablet by mouth Daily., Disp: , Rfl:     simvastatin (ZOCOR) 20 MG tablet, Take 1 tablet by mouth Every Night., Disp: , Rfl:     tamsulosin (FLOMAX) 0.4 MG capsule 24 hr capsule, Take 1 capsule by mouth Daily., Disp: , Rfl:     No Known Allergies    The following portions of the patient's history were reviewed and updated as appropriate: allergies, current medications, past family history, past medical history, past social history, past surgical history and problem list.    Objective     Physical Exam  Constitutional:       General: He is not in acute distress.     Appearance: Normal appearance. He is well-developed. He is not diaphoretic.   HENT:      Head: Normocephalic and atraumatic.      Right Ear: External ear normal.      Left Ear: External ear normal.      Nose: Nose normal.   Eyes:      General: No scleral icterus.        Right eye: No discharge.         Left  eye: No discharge.      Conjunctiva/sclera: Conjunctivae normal.   Neck:      Trachea: No tracheal deviation.   Pulmonary:      Effort: Pulmonary effort is normal. No respiratory distress.   Musculoskeletal:         General: Normal range of motion.      Cervical back: Normal range of motion.   Skin:     Coloration: Skin is not pale.      Findings: No erythema or rash.   Neurological:      Mental Status: He is alert and oriented to person, place, and time.      Coordination: Coordination normal.   Psychiatric:      Comments: Good eye contact, smiles, answers questions briefly       Vitals:    05/07/25 1450   BP: 122/86   Pulse: 65   SpO2: 99%   Weight: 74.8 kg (165 lb)     Results Review:   I have reviewed the patient's new clinical and imaging results.    Labs 9/2024 HIV negative, Hep C ab negative, CMP normal, Hgb normal.     No recent abdominal imaging on file.     Assessment / Plan      1. Gastroesophageal reflux disease, unspecified whether esophagitis present  2. Abnormal stools  3. Dark stools  4. Incontinence of feces, unspecified fecal incontinence type  Over the past 6 months, patient has had declining health. He is unable to give his own history. His friend reports fecal incontinence, malodorous stools, dark in color, GERD and sometimes oily stools. Had colonoscopy approx 5 years ago, thinks polyps were removed then, details unknown. On PPI daily with good control of reflux symptoms. No reported vomiting. No recent abdominal imaging on file. Need to rule out any GI bleeding, infection, among others.     Stool testing  EGD and colonoscopy to be arranged  Avoid NSAIDs  Continue low dose PPI for now    - Calprotectin, Fecal - Stool, Per Rectum; Future  - Clostridioides difficile Toxin, PCR - Stool, Per Rectum; Future  - Pancreatic Elastase, Fecal - Stool, Per Rectum; Future  - Stool Culture (Reference Lab) - Stool, Per Rectum; Future    He will have an esophagogastroduodenoscopy and colonoscopy performed with  monitored anesthesia care. The indications, technique, alternatives and potential risk and complications were discussed with the patient including but not limited to bleeding, bowel perforations, missing lesions and anesthetic complications. The patient understands and wishes to proceed with the procedure and has given their verbal consent. Written patient education information was given to the patient. He should follow up in the office after this procedure to discuss the results and further recommendations can be made at that time. The patient will call if they have further questions before procedure.  - Case Request  - polyethylene glycol (GoLYTELY) 236 g solution; Follow instructions given at office  Dispense: 4000 mL; Refill: 0  - bisacodyl (Dulcolax) 5 MG EC tablet; Follow instructions given at office  Dispense: 4 tablet; Refill: 0      Follow Up:   Return for follow up after procedure to discuss results.    Gaye Barfield PA-C  Gastroenterology [place]  5/7/2025  17:10 EDT

## 2025-05-19 ENCOUNTER — TELEPHONE (OUTPATIENT)
Dept: NEUROLOGY | Facility: CLINIC | Age: 70
End: 2025-05-19

## 2025-05-19 NOTE — TELEPHONE ENCOUNTER
URGENT    Medication requested (name and dose):      donepezil (ARICEPT) 10 MG tablet   Take 1 tablet by mouth Every Night.,     Pharmacy where request should be sent:      [address] [phone] [facility] [phone] FX     Additional details provided by patient:      Best call back number:  [phone]    Does the patient have less than a 3 day supply:  [x] Yes  [] No    Corbin Hill Rep  05/19/25,

## 2025-05-20 RX ORDER — DONEPEZIL HYDROCHLORIDE 10 MG/1
10 TABLET, FILM COATED ORAL NIGHTLY
Qty: 30 TABLET | Refills: 11 | Status: SHIPPED | OUTPATIENT
Start: 2025-05-20

## 2025-05-20 NOTE — TELEPHONE ENCOUNTER
[facility].

## 2025-05-23 ENCOUNTER — ANESTHESIA EVENT (OUTPATIENT)
Dept: GASTROENTEROLOGY | Facility: HOSPITAL | Age: 70
End: 2025-05-23
Payer: MEDICARE

## 2025-05-23 ENCOUNTER — ANESTHESIA (OUTPATIENT)
Dept: GASTROENTEROLOGY | Facility: HOSPITAL | Age: 70
End: 2025-05-23
Payer: MEDICARE

## 2025-05-23 ENCOUNTER — HOSPITAL ENCOUNTER (OUTPATIENT)
Facility: HOSPITAL | Age: 70
Setting detail: HOSPITAL OUTPATIENT SURGERY
Discharge: HOME | End: 2025-05-23
Attending: INTERNAL MEDICINE | Admitting: INTERNAL MEDICINE
Payer: MEDICARE

## 2025-05-23 ENCOUNTER — TELEPHONE (OUTPATIENT)
Dept: GASTROENTEROLOGY | Facility: CLINIC | Age: 70
End: 2025-05-23
Payer: MEDICARE

## 2025-05-23 VITALS
OXYGEN SATURATION: 94 % | SYSTOLIC BLOOD PRESSURE: 105 MMHG | DIASTOLIC BLOOD PRESSURE: 75 MMHG | RESPIRATION RATE: 16 BRPM | HEART RATE: 86 BPM | TEMPERATURE: 97.4 F

## 2025-05-23 DIAGNOSIS — R19.5 ABNORMAL STOOLS: ICD-10-CM

## 2025-05-23 DIAGNOSIS — K21.9 GASTROESOPHAGEAL REFLUX DISEASE, UNSPECIFIED WHETHER ESOPHAGITIS PRESENT: Primary | ICD-10-CM

## 2025-05-23 DIAGNOSIS — K21.9 GASTROESOPHAGEAL REFLUX DISEASE, UNSPECIFIED WHETHER ESOPHAGITIS PRESENT: ICD-10-CM

## 2025-05-23 LAB — GLUCOSE BLDC GLUCOMTR-MCNC: 89 MG/DL (ref 70–130)

## 2025-05-23 PROCEDURE — 82948 REAGENT STRIP/BLOOD GLUCOSE: CPT

## 2025-05-23 PROCEDURE — 43239 EGD BIOPSY SINGLE/MULTIPLE: CPT | Performed by: INTERNAL MEDICINE

## 2025-05-23 PROCEDURE — 45380 COLONOSCOPY AND BIOPSY: CPT | Performed by: INTERNAL MEDICINE

## 2025-05-23 PROCEDURE — 25810000003 SODIUM CHLORIDE 0.9 % SOLUTION: Performed by: PHYSICIAN ASSISTANT

## 2025-05-23 PROCEDURE — 25010000002 PROPOFOL 10 MG/ML EMULSION: Performed by: NURSE ANESTHETIST, CERTIFIED REGISTERED

## 2025-05-23 PROCEDURE — 25810000003 SODIUM CHLORIDE 0.9 % SOLUTION: Performed by: NURSE ANESTHETIST, CERTIFIED REGISTERED

## 2025-05-23 PROCEDURE — 25010000002 MIDAZOLAM PER 1MG: Performed by: NURSE ANESTHETIST, CERTIFIED REGISTERED

## 2025-05-23 PROCEDURE — 45385 COLONOSCOPY W/LESION REMOVAL: CPT | Performed by: INTERNAL MEDICINE

## 2025-05-23 RX ORDER — PROPOFOL 10 MG/ML
VIAL (ML) INTRAVENOUS AS NEEDED
Status: DISCONTINUED | OUTPATIENT
Start: 2025-05-23 | End: 2025-05-23 | Stop reason: SURG

## 2025-05-23 RX ORDER — MIDAZOLAM HYDROCHLORIDE 2 MG/2ML
INJECTION, SOLUTION INTRAMUSCULAR; INTRAVENOUS AS NEEDED
Status: DISCONTINUED | OUTPATIENT
Start: 2025-05-23 | End: 2025-05-23 | Stop reason: SURG

## 2025-05-23 RX ORDER — LIDOCAINE HCL/PF 100 MG/5ML
SYRINGE (ML) INJECTION AS NEEDED
Status: DISCONTINUED | OUTPATIENT
Start: 2025-05-23 | End: 2025-05-23 | Stop reason: SURG

## 2025-05-23 RX ORDER — SODIUM CHLORIDE 9 MG/ML
30 INJECTION, SOLUTION INTRAVENOUS CONTINUOUS PRN
Status: DISCONTINUED | OUTPATIENT
Start: 2025-05-23 | End: 2025-05-23 | Stop reason: HOSPADM

## 2025-05-23 RX ORDER — OMEPRAZOLE 20 MG/1
20 CAPSULE, DELAYED RELEASE ORAL DAILY
Qty: 90 CAPSULE | Refills: 3 | Status: SHIPPED | OUTPATIENT
Start: 2025-05-23

## 2025-05-23 RX ORDER — SIMETHICONE 40MG/0.6ML
SUSPENSION, DROPS(FINAL DOSAGE FORM)(ML) ORAL AS NEEDED
Status: DISCONTINUED | OUTPATIENT
Start: 2025-05-23 | End: 2025-05-23 | Stop reason: HOSPADM

## 2025-05-23 RX ORDER — SODIUM CHLORIDE 9 MG/ML
INJECTION, SOLUTION INTRAVENOUS CONTINUOUS PRN
Status: DISCONTINUED | OUTPATIENT
Start: 2025-05-23 | End: 2025-05-23 | Stop reason: SURG

## 2025-05-23 RX ADMIN — SODIUM CHLORIDE 30 ML/HR: 9 INJECTION, SOLUTION INTRAVENOUS at 11:53

## 2025-05-23 RX ADMIN — MIDAZOLAM HYDROCHLORIDE 2 MG: 1 INJECTION, SOLUTION INTRAMUSCULAR; INTRAVENOUS at 12:11

## 2025-05-23 RX ADMIN — SODIUM CHLORIDE: 9 INJECTION, SOLUTION INTRAVENOUS at 12:04

## 2025-05-23 RX ADMIN — Medication 40 MG: at 12:11

## 2025-05-23 RX ADMIN — PROPOFOL 450 MG: 10 INJECTION, EMULSION INTRAVENOUS at 12:11

## 2025-05-23 NOTE — TELEPHONE ENCOUNTER
Caller: GALINA GLASS    Relationship: Emergency Contact    Best call back number: [phone]     Requested Prescriptions:   - COLONOSCOPY PREP    - omeprazole (priLOSEC) 20 MG capsule       Pharmacy where request should be sent: [facility] [address] [place] [address] [address] [facility] - [phone] PH - [phone] FX     Last office visit with prescribing clinician: 5/7/2025     Additional details provided by patient: PATIENT AND DAUGHTER IN LAW ACCIDENTALLY PREPPED FOR WRONG DATE. PATIENT'S PROCEDURE WAS SCHEDULED FOR JUNE 23, BUT HE WENT IN TODAY 5/23. HE WILL NEED HIS COLONOSCOPY PREP RESENT SO HE HAS IT FOR NEXT MONTH. HE IS ALSO ALMOST OUT OF HIS OMEPRAZOLE.     Does the patient have less than a 3 day supply:  [x] Yes  [] No    Would you like a call back once the refill request has been completed: [] Yes [x] No    If the office needs to give you a call back, can they leave a voicemail: [] Yes [x] No

## 2025-05-23 NOTE — H&P
[facility]  HISTORY AND PHYSICAL    Patient Name: Mustapha Solano  : 1955  MRN: [id number]    Chief Complaint:   For screening colonoscopy/ EGD    History Of Presenting Illness:    GERD   diarrhea   Average risk screening  Black stool     Past Medical History:   Diagnosis Date    Colon polyps     Diabetes mellitus     GERD (gastroesophageal reflux disease)     Hypertension     Memory loss     Scrotal abscess        Past Surgical History:   Procedure Laterality Date    LYMPH NODE BIOPSY         Social History     Socioeconomic History    Marital status: Single   Tobacco Use    Smoking status: Never   Vaping Use    Vaping status: Never Used   Substance and Sexual Activity    Alcohol use: Not Currently    Drug use: Never       Family History   Problem Relation Age of Onset    Cancer Mother     Coronary artery disease Father     Heart attack Father        Prior to Admission Medications:  Medications Prior to Admission   Medication Sig Dispense Refill Last Dose/Taking    bisacodyl (Dulcolax) 5 MG EC tablet Follow instructions given at office 4 tablet 0 2025    Cholecalciferol 25 MCG (1000 UT) tablet Take 1 tablet by mouth Daily.   2025    clobetasol (TEMOVATE) 0.05 % ointment    2025    clotrimazole (LOTRIMIN) 1 % cream Apply 1 Application topically to the appropriate area as directed 2 (Two) Times a Day.   2025    clotrimazole-betamethasone (LOTRISONE) 1-0.05 % cream    2025    donepezil (ARICEPT) 10 MG tablet Take 1 tablet by mouth Every Night. 30 tablet 11 2025    fluocinonide (LIDEX) 0.05 % cream    2025    lisinopril (PRINIVIL,ZESTRIL) 20 MG tablet Take 1 tablet by mouth Daily.   2025    memantine (NAMENDA) 10 MG tablet Take 1 tablet by mouth Daily. 30 tablet 2 2025    Mirabegron ER (MYRBETRIQ) 50 MG tablet sustained-release 24 hour 24 hr tablet Take 50 mg by mouth Daily.   2025    multivitamin with minerals tablet tablet Take 1 tablet by  mouth Daily.   2025    mupirocin (BACTROBAN) 2 % ointment SMARTSI Application Topical 2-3 Times Daily   2025    omeprazole (priLOSEC) 20 MG capsule Take 1 capsule by mouth Daily.   2025    oxybutynin XL (DITROPAN-XL) 10 MG 24 hr tablet Take 1 tablet by mouth Daily.   2025    polyethylene glycol (GoLYTELY) 236 g solution Follow instructions given at office 4000 mL 0 2025    simvastatin (ZOCOR) 20 MG tablet Take 1 tablet by mouth Every Night.   2025    tamsulosin (FLOMAX) 0.4 MG capsule 24 hr capsule Take 1 capsule by mouth Daily.   2025       Allergies:  No Known Allergies     Vitals: Temp:  [98.1 °F (36.7 °C)] 98.1 °F (36.7 °C)  Heart Rate:  [58] 58  Resp:  [16] 16  BP: (121)/(93) 121/93    Review Of Systems:  Constitutional:  Negative for chills, fever, and unexpected weight change.  Respiratory:  Negative for cough, chest tightness, shortness of breath, and wheezing.  Cardiovascular:  Negative for chest pain, palpitations, and leg swelling.  Gastrointestinal:  Negative for abdominal distention, abdominal pain, nausea, vomiting.  Neurological:  Negative for weakness, numbness, and headaches.     Physical Exam:    General Appearance:  Alert, cooperative, in no acute distress.   Lungs:   Clear to auscultation, respirations regular, even and                 unlabored.   Heart:  Regular rhythm and normal rate.   Abdomen:   Normal bowel sounds, no masses, no organomegaly. Soft, nontender, nondistended   Neurologic: Alert and oriented x 3. Moves all four limbs equally       Assessment & Plan     Assessment:  Active Problems:    Gastroesophageal reflux disease    Abnormal stools      Plan: Esophagogastroduodenoscopy (N/A), Colonoscopy (N/A)     Ryland Rankin MD  2025

## 2025-05-23 NOTE — ANESTHESIA PREPROCEDURE EVALUATION
Anesthesia Evaluation     Patient summary reviewed and Nursing notes reviewed   no history of anesthetic complications:   NPO Solid Status: > 8 hours  NPO Liquid Status: > 8 hours           Airway   Mallampati: II  TM distance: >3 FB  Neck ROM: full  no difficulty expected and Possible difficult intubation  Dental - normal exam     Pulmonary - negative pulmonary ROS and normal exam   Cardiovascular - normal exam    (+) hypertension less than 2 medications, hyperlipidemia      Neuro/Psych- negative ROS  GI/Hepatic/Renal/Endo    (+) GERD, diabetes mellitus type 2    Musculoskeletal (-) negative ROS    Abdominal    Substance History - negative use     OB/GYN negative ob/gyn ROS         Other - negative ROS                   Anesthesia Plan    ASA 3     MAC     (Pt told that intravenous sedation will be used as the primary anesthetic along with local anesthesia if necessary. Every effort will be made to make sure the patient is comfortable.     The patient was told they may or may not have recall for the procedure. It was further explained that if the MAC was not adequate that a general anesthetic with either an LMA or endotracheal tube would be required.     Will proceed with the plan of care.)  intravenous induction     Anesthetic plan, risks, benefits, and alternatives have been provided, discussed and informed consent has been obtained with: patient.    CODE STATUS:

## 2025-05-23 NOTE — DISCHARGE INSTRUCTIONS
- Discharge patient to home (ambulatory).   - High fiber diet.   - PPI daily  - Avoid NSAIDS  - Continue present medications.   - Await pathology results.   - Repeat colonoscopy in 3 - 5 years for surveillance pending path.   - Return to GI office in 8 weeks.     To assist you in voiding:  Drink plenty of fluids  Listen to running water while attempting to void.    If you are unable to urinate and you have an uncomfortable urge to void or it has been   6 hours since you were discharged, return to the Emergency Room  No pushing, pulling, tugging,  heavy lifting, or strenuous activity.  No major decision making, driving, or drinking alcoholic beverages for 24 hours. ( due to the medications you have  received)  Always use good hand hygiene/washing techniques.  NO driving while taking pain medications.    * if you have an incision:  Check your incision area every day for signs of infection.   Check for:  * more redness, swelling, or pain  *more fluid or blood  *warmth  *pus or bad smell

## 2025-05-23 NOTE — ANESTHESIA POSTPROCEDURE EVALUATION
Patient: Mustapha Solano    Procedure Summary       Date: 05/23/25 Room / Location: [facility] ENDOSCOPY 2 / [facility] ENDOSCOPY    Anesthesia Start: 1204 Anesthesia Stop: 1250    Procedures:       Esophagogastroduodenoscopy with biopsy      Colonoscopy with biopsy and polypectomy (Anus) Diagnosis:       Gastroesophageal reflux disease, unspecified whether esophagitis present      Abnormal stools      (Gastroesophageal reflux disease, unspecified whether esophagitis present [K21.9])      (Abnormal stools [R19.5])    Surgeons: Ryland Rankin MD Provider: Atul Santiago CRNA    Anesthesia Type: MAC ASA Status: 3            Anesthesia Type: MAC    Vitals  Vitals Value Taken Time   /75 05/23/25 13:22   Temp 97.4 °F (36.3 °C) 05/23/25 13:22   Pulse 86 05/23/25 13:22   Resp 16 05/23/25 13:22   SpO2 94 % 05/23/25 13:22           Post Anesthesia Care and Evaluation    Patient location during evaluation: PHASE II  Patient participation: complete - patient participated  Level of consciousness: awake and alert  Pain management: adequate    Airway patency: patent  Anesthetic complications: No anesthetic complications  PONV Status: none  Cardiovascular status: acceptable  Respiratory status: acceptable  Hydration status: acceptable  No anesthesia care post op

## 2025-05-28 LAB — REF LAB TEST METHOD: NORMAL

## 2025-06-30 RX ORDER — MEMANTINE HYDROCHLORIDE 10 MG/1
10 TABLET ORAL DAILY
Qty: 30 TABLET | Refills: 2 | Status: SHIPPED | OUTPATIENT
Start: 2025-06-30 | End: 2026-06-30

## 2025-06-30 NOTE — TELEPHONE ENCOUNTER
Caller: GALINA GLASS    Relationship: Emergency Contact    Best call back number: [phone]    Requested Prescriptions:   Requested Prescriptions     Pending Prescriptions Disp Refills    memantine (NAMENDA) 10 MG tablet 30 tablet 2     Sig: Take 1 tablet by mouth Daily.        Pharmacy where request should be sent: [facility] [address] [place] [address] [phone] [facility] [phone] FX     Last office visit with prescribing clinician: 4/1/2025   Last telemedicine visit with prescribing clinician: Visit date not found   Next office visit with prescribing clinician: 8/12/2025     Additional details provided by patient: HAS 3-4 LEFT    Does the patient have less than a 3 day supply:  [] Yes  [x] No    Would you like a call back once the refill request has been completed: [] Yes [x] No    If the office needs to give you a call back, can they leave a voicemail: [] Yes [x] No    Corbin Pederson Rep   06/30/25 08:27 EDT

## 2025-06-30 NOTE — TELEPHONE ENCOUNTER
Rx Refill Note  Requested Prescriptions     Pending Prescriptions Disp Refills    memantine (NAMENDA) 10 MG tablet 30 tablet 2     Sig: Take 1 tablet by mouth Daily.      Last filled: 4/1/25, 30 with 2 refills  Last office visit with prescribing clinician: 4/1/2025      Next office visit with prescribing clinician: 8/12/2025     Rasheeda Dang MA  06/30/25, 10:00 EDT

## 2025-07-09 ENCOUNTER — HOSPITAL ENCOUNTER (OUTPATIENT)
Dept: MRI IMAGING | Facility: HOSPITAL | Age: 70
Discharge: HOME | End: 2025-07-09
Admitting: NURSE PRACTITIONER
Payer: MEDICARE

## 2025-07-09 DIAGNOSIS — R15.2 INCONTINENCE OF FECES WITH FECAL URGENCY: ICD-10-CM

## 2025-07-09 DIAGNOSIS — N39.498 OTHER URINARY INCONTINENCE: ICD-10-CM

## 2025-07-09 DIAGNOSIS — R47.1 DYSARTHRIA: ICD-10-CM

## 2025-07-09 DIAGNOSIS — R41.3 MEMORY LOSS: ICD-10-CM

## 2025-07-09 DIAGNOSIS — R15.9 INCONTINENCE OF FECES WITH FECAL URGENCY: ICD-10-CM

## 2025-07-09 DIAGNOSIS — R45.86 EMOTIONAL LABILITY: ICD-10-CM

## 2025-07-09 PROCEDURE — 70553 MRI BRAIN STEM W/O & W/DYE: CPT

## 2025-07-09 PROCEDURE — A9573 GADOPICLENOL 0.5 MMOL/ML SOLUTION: HCPCS | Performed by: NURSE PRACTITIONER

## 2025-07-09 PROCEDURE — 25510000001 GADOPICLENOL 0.5 MMOL/ML SOLUTION: Performed by: NURSE PRACTITIONER

## 2025-07-09 RX ADMIN — GADOPICLENOL 7 ML: 485.1 INJECTION INTRAVENOUS at 14:31

## 2025-07-10 ENCOUNTER — RESULTS FOLLOW-UP (OUTPATIENT)
Dept: NEUROLOGY | Facility: CLINIC | Age: 70
End: 2025-07-10
Payer: MEDICARE

## 2025-07-10 NOTE — PROGRESS NOTES
Please let Corrine Russ know that MRI of the brain shows some generalized volume loss consistent with aging.  We will review the MRI at the follow-up appointment.

## 2025-07-17 NOTE — TELEPHONE ENCOUNTER
"Relay     \"Please let Mr. Solano know that MRI of the brain shows some generalized volume loss consistent with aging. We will review the MRI at the follow-up appointment.\"  "

## 2025-08-08 ENCOUNTER — TELEPHONE (OUTPATIENT)
Facility: HOSPITAL | Age: 70
End: 2025-08-08
Payer: MEDICARE

## 2025-08-11 ENCOUNTER — TELEPHONE (OUTPATIENT)
Facility: HOSPITAL | Age: 70
End: 2025-08-11
Payer: MEDICARE

## 2025-08-25 ENCOUNTER — OFFICE VISIT (OUTPATIENT)
Dept: GASTROENTEROLOGY | Facility: CLINIC | Age: 70
End: 2025-08-25
Payer: MEDICARE

## 2025-08-25 VITALS
DIASTOLIC BLOOD PRESSURE: 78 MMHG | OXYGEN SATURATION: 98 % | BODY MASS INDEX: 22.18 KG/M2 | WEIGHT: 159 LBS | SYSTOLIC BLOOD PRESSURE: 140 MMHG | HEART RATE: 48 BPM

## 2025-08-25 DIAGNOSIS — R15.9 INCONTINENCE OF FECES, UNSPECIFIED FECAL INCONTINENCE TYPE: ICD-10-CM

## 2025-08-25 DIAGNOSIS — D12.6 TUBULAR ADENOMA OF COLON: ICD-10-CM

## 2025-08-25 DIAGNOSIS — K31.89 EROSIVE GASTROPATHY: ICD-10-CM

## 2025-08-25 DIAGNOSIS — K44.9 HIATAL HERNIA: Primary | ICD-10-CM

## 2025-08-25 PROCEDURE — 99214 OFFICE O/P EST MOD 30 MIN: CPT | Performed by: PHYSICIAN ASSISTANT

## 2025-08-25 PROCEDURE — 1159F MED LIST DOCD IN RCRD: CPT | Performed by: PHYSICIAN ASSISTANT

## 2025-08-25 PROCEDURE — 1160F RVW MEDS BY RX/DR IN RCRD: CPT | Performed by: PHYSICIAN ASSISTANT

## 2025-08-25 RX ORDER — FLUTICASONE PROPIONATE 50 MCG
SPRAY, SUSPENSION (ML) NASAL
COMMUNITY
Start: 2025-06-03

## (undated) DEVICE — DEVICE INFL 60ML 12ATM CONVENIENT LOK REL HNDL HI PRSS FLX

## (undated) DEVICE — VLV SXN AIR/H2O ORCAPOD3 1P/U STRL

## (undated) DEVICE — SYR 50ML SLIP TIP NSAF LF STRL --

## (undated) DEVICE — FRCP BX RADJAW4 NDL 2.8 240 STD OG

## (undated) DEVICE — SINGLE-USE POLYPECTOMY SNARE: Brand: CAPTIVATOR II

## (undated) DEVICE — FLEX ADVANTAGE 1500CC: Brand: FLEX ADVANTAGE

## (undated) DEVICE — KIT COLON W/ 1.1OZ LUB AND 2 END

## (undated) DEVICE — LUBE JELLY PK/2.75GM STRL BX/144

## (undated) DEVICE — CONMED SCOPE SAVER BITE BLOCK, 20X27 MM: Brand: SCOPE SAVER

## (undated) DEVICE — Device

## (undated) DEVICE — BASIN EMESIS 500CC ROSE 250/CS 60/PLT: Brand: MEDEGEN MEDICAL PRODUCTS, LLC

## (undated) DEVICE — SYR LUER SLPTP 50ML

## (undated) DEVICE — MEDI-VAC NON-CONDUCTIVE SUCTION TUBING: Brand: CARDINAL HEALTH

## (undated) DEVICE — CANISTER, RIGID, 2000CC: Brand: MEDLINE INDUSTRIES, INC.

## (undated) DEVICE — ENDOSCOPY PORT CONNECTOR FOR OLYMPUS® SCOPES: Brand: ERBE

## (undated) DEVICE — QUICK CATCH IN-LINE SUCTION POLYP TRAP IS USED FOR SUCTION RETRIEVAL OF ENDOSCOPICALLY REMOVED POLYPS.

## (undated) DEVICE — HYBRID CO2 TUBING/CAP SET FOR OLYMPUS® SCOPES & CO2 SOURCE: Brand: ERBE

## (undated) DEVICE — KENDALL 500 SERIES DIAPHORETIC FOAM MONITORING ELECTRODE - TEAR DROP SHAPE ( 30/PK): Brand: KENDALL